# Patient Record
Sex: FEMALE | Employment: OTHER | URBAN - METROPOLITAN AREA
[De-identification: names, ages, dates, MRNs, and addresses within clinical notes are randomized per-mention and may not be internally consistent; named-entity substitution may affect disease eponyms.]

---

## 2017-06-21 ENCOUNTER — OFFICE VISIT (OUTPATIENT)
Dept: HEMATOLOGY | Age: 72
End: 2017-06-21

## 2017-06-21 VITALS
BODY MASS INDEX: 27.82 KG/M2 | RESPIRATION RATE: 18 BRPM | OXYGEN SATURATION: 94 % | HEART RATE: 87 BPM | WEIGHT: 157 LBS | DIASTOLIC BLOOD PRESSURE: 66 MMHG | TEMPERATURE: 98.2 F | HEIGHT: 63 IN | SYSTOLIC BLOOD PRESSURE: 155 MMHG

## 2017-06-21 DIAGNOSIS — K75.4 AUTOIMMUNE HEPATITIS (HCC): Primary | ICD-10-CM

## 2017-06-21 PROBLEM — Z90.49 S/P LAPAROSCOPIC CHOLECYSTECTOMY: Status: ACTIVE | Noted: 2017-05-14

## 2017-06-21 PROBLEM — M35.00 SJOGREN'S SYNDROME (HCC): Status: ACTIVE | Noted: 2017-06-21

## 2017-06-21 PROBLEM — J44.9 CHRONIC OBSTRUCTIVE PULMONARY DISEASE (HCC): Status: ACTIVE | Noted: 2017-02-16

## 2017-06-21 PROBLEM — R10.9 ABDOMINAL PAIN: Status: ACTIVE | Noted: 2017-04-30

## 2017-06-21 PROBLEM — K80.20 CHOLELITHIASIS: Status: ACTIVE | Noted: 2017-02-16

## 2017-06-21 PROBLEM — K42.9 UMBILICAL HERNIA: Status: ACTIVE | Noted: 2017-02-16

## 2017-06-21 PROBLEM — K73.9 CHRONIC HEPATITIS (HCC): Status: ACTIVE | Noted: 2017-05-24

## 2017-06-21 PROBLEM — R91.8 MULTIPLE PULMONARY NODULES: Status: ACTIVE | Noted: 2017-02-16

## 2017-06-21 PROBLEM — E03.9 HYPOTHYROIDISM: Status: ACTIVE | Noted: 2017-06-21

## 2017-06-21 PROBLEM — R10.9 ABDOMINAL PAIN: Status: RESOLVED | Noted: 2017-04-30 | Resolved: 2017-06-21

## 2017-06-21 PROBLEM — K21.9 GERD (GASTROESOPHAGEAL REFLUX DISEASE): Status: ACTIVE | Noted: 2017-06-21

## 2017-06-21 PROBLEM — K81.9 CHOLECYSTITIS: Status: ACTIVE | Noted: 2017-04-27

## 2017-06-21 RX ORDER — CALCIUM CARBONATE 600 MG
TABLET ORAL
COMMUNITY

## 2017-06-21 RX ORDER — CETIRIZINE HCL 10 MG
10 TABLET ORAL
COMMUNITY

## 2017-06-21 RX ORDER — INSULIN PUMP SYRINGE, 3 ML
EACH MISCELLANEOUS
COMMUNITY
Start: 2016-04-05

## 2017-06-21 RX ORDER — LANCETS 28 GAUGE
1 EACH MISCELLANEOUS
COMMUNITY
Start: 2017-02-08

## 2017-06-21 RX ORDER — OXYCODONE HYDROCHLORIDE 5 MG/1
5 TABLET ORAL
COMMUNITY
Start: 2017-05-01

## 2017-06-21 RX ORDER — CHOLECALCIFEROL (VITAMIN D3) 125 MCG
CAPSULE ORAL
COMMUNITY

## 2017-06-21 RX ORDER — DOCUSATE SODIUM 100 MG/1
100 CAPSULE, LIQUID FILLED ORAL
COMMUNITY
Start: 2017-05-01

## 2017-06-21 NOTE — MR AVS SNAPSHOT
Visit Information Date & Time Provider Department Dept. Phone Encounter #  
 6/21/2017  1:50 PM Kayla Dobson MD [facility] [place] [phone] [id number] Upcoming Health Maintenance Date Due DTaP/Tdap/Td series (1 - Tdap) 6/18/1964 FOBT Q 1 YEAR AGE 50-75 6/18/1993 ZOSTER VACCINE AGE 60> 6/18/2003 GLAUCOMA SCREENING Q2Y 6/18/2008 OSTEOPOROSIS SCREENING (DEXA) 6/18/2008 Pneumococcal 65+ Low/Medium Risk (1 of 2 - PCV13) 6/18/2008 MEDICARE YEARLY EXAM 6/18/2008 INFLUENZA AGE 9 TO ADULT 8/1/2017 BREAST CANCER SCRN MAMMOGRAM 11/3/2017 Allergies as of 6/21/2017  Review Complete On: 6/21/2017 By: Ld Moore Severity Noted Reaction Type Reactions Nsaids (Non-steroidal Anti-inflammatory Drug) Medium 02/17/2010    Rash Celecoxib  04/27/2017    Other (comments)  
 gi distress Dolobid [Diflunisal]  03/29/2017    Hives Current Immunizations  Never Reviewed No immunizations on file. Not reviewed this visit Vitals BP Pulse Temp Resp Height(growth percentile) 155/66 (BP 1 Location: Right arm, BP Patient Position: Sitting) 87 98.2 °F (36.8 °C) (Tympanic) 18 5' 3\" (1.6 m) Weight(growth percentile) SpO2 BMI OB Status Smoking Status 157 lb (71.2 kg) 94% 27.81 kg/m2 Postmenopausal Former Smoker BMI and BSA Data Body Mass Index Body Surface Area  
 27.81 kg/m 2 1.78 m 2 Preferred Pharmacy Pharmacy Name Phone [address], [address] [place] [phone] Your Updated Medication List  
  
   
This list is accurate as of: 6/21/17  2:38 PM.  Always use your most recent med list.  
  
  
  
  
 ACTOS PO Take  by mouth. albuterol 90 mcg/actuation inhaler Commonly known as:  PROVENTIL HFA, VENTOLIN HFA, PROAIR HFA Take  by inhalation. amitriptyline 75 mg tablet Commonly known as:  ELAVIL Take 10 mg by mouth nightly. ATENOLOL PO Take 25 mg by mouth.  
  
 calcium carbonate 600 mg calcium (1,500 mg) tablet Commonly known as:  [place] Take 1 Tab by Mouth Once a Day. cetirizine 10 mg tablet Commonly known as:  ZYRTEC 10 mg.  
  
 docusate sodium 100 mg capsule Commonly known as:  COLACE  
100 mg. FENOFIBRATE PO Take  by mouth. FLEXERIL PO Take  by mouth. FREESTYLE LANCETS 28 gauge Misc Generic drug:  lancets 1 Each. FREESTYLE LITE METER monitoring kit Generic drug:  Blood-Glucose Meter For diabetes testing. E11.42  
  
 FREESTYLE LITE STRIPS strip Generic drug:  glucose blood VI test strips 50 Each.  
  
 gabapentin 300 mg capsule Commonly known as:  NEURONTIN Take 300 mg by mouth four (4) times daily. LASIX PO Take  by mouth.  
  
 levothyroxine 112 mcg tablet Commonly known as:  SYNTHROID Take  by mouth Daily (before breakfast). LOSARTAN PO Take 100 mg by mouth.  
  
 melatonin Tab tablet Take 1 Tab by Mouth Every Night at Bedtime. METFORMIN PO Take  by mouth two (2) times a day. NEXIUM PO Take 40 mg by mouth. OCUVITE PO Take  by mouth. OXYBUTYNIN CHLORIDE PO Take  by mouth. oxyCODONE IR 5 mg immediate release tablet Commonly known as:  ROXICODONE  
5 mg. SALAGEN (PILOCARPINE) 5 mg tablet Generic drug:  pilocarpine Take 5 mg by mouth three (3) times daily. SINGULAIR 10 mg tablet Generic drug:  montelukast  
Take 10 mg by mouth daily. SPIRIVA WITH HANDIHALER 18 mcg inhalation capsule Generic drug:  tiotropium Take 1 Cap by inhalation daily. TRAMADOL PO Take  by mouth. VITAMIN D2 50,000 unit capsule Generic drug:  ergocalciferol Take 50,000 Units by mouth. ZETIA 10 mg tablet Generic drug:  ezetimibe Take  by mouth. Introducing [facility]!    
 Elie Avila introduces Backplane patient portal. Now you can access parts of your medical record, email your doctor's office, and request medication refills online. 1. In your internet browser, go to [email or web address] 2. Click on the First Time User? Click Here link in the Sign In box. You will see the New Member Sign Up page. 3. Enter your GetGifted Access Code exactly as it appears below. You will not need to use this code after youve completed the sign-up process. If you do not sign up before the expiration date, you must request a new code. · GetGifted Access Code: [email or web address] Expires: 9/19/2017  2:34 PM 
 
4. Enter the last four digits of your Social Security Number (xxxx) and Date of Birth (mm/dd/yyyy) as indicated and click Submit. You will be taken to the next sign-up page. 5. Create a GetGifted ID. This will be your GetGifted login ID and cannot be changed, so think of one that is secure and easy to remember. 6. Create a GetGifted password. You can change your password at any time. 7. Enter your Password Reset Question and Answer. This can be used at a later time if you forget your password. 8. Enter your e-mail address. You will receive e-mail notification when new information is available in [address] 9. Click Sign Up. You can now view and download portions of your medical record. 10. Click the Download Summary menu link to download a portable copy of your medical information. If you have questions, please visit the Frequently Asked Questions section of the Get[employer] website. Remember, GetGifted is NOT to be used for urgent needs. For medical emergencies, dial 911. Now available from your iPhone and Android! Please provide this summary of care documentation to your next provider. Your primary care clinician is listed as Monique Modi. If you have any questions after today's visit, please call [phone].

## 2017-06-21 NOTE — PROGRESS NOTES
[address] [facility] MD [place], MONI Bartlett PA-C [place], NP        at [address]     [address] [place] [address], [address] [address]  22.     [phone]     FAX: [phone]    at [facility]     [address] [facility] [place], [address]     [facility], [address]     [phone]     FAX: [phone]         Patient Care Team:  Romulo Kohli MD as PCP - General (Family Practice)  Herminio Granado MD (Gastroenterology)      Problem List  Date Reviewed: 7/16/2017          Codes Class Noted    GERD (gastroesophageal reflux disease) ICD-10-CM: K21.9  ICD-9-CM: 530.81  6/21/2017        Hypothyroidism ICD-10-CM: E03.9  ICD-9-CM: 244.9  6/21/2017        Sjogren's syndrome ([facility] ICD-10-CM: M35.00  ICD-9-CM: 710.2  6/21/2017    Overview Signed 6/21/2017  1:47 PM by Radha Stevenson     Overview:   recurrent parotitis prior parotid biopsy             Chronic hepatitis ([facility] ICD-10-CM: K73.9  ICD-9-CM: 571.40  5/24/2017        S/P laparoscopic cholecystectomy ICD-10-CM: Z90.49  ICD-9-CM: V45.89  5/14/2017        Chronic obstructive pulmonary disease ([facility] ICD-10-CM: J44.9  ICD-9-CM: 496  2/16/2017        Multiple pulmonary nodules ICD-10-CM: R91.8  ICD-9-CM: 793.19  2/92/4786        Umbilical hernia [facility]-: K42.9  ICD-9-CM: 553.1  2/16/2017        Type 2 diabetes mellitus with diabetic nephropathy ([facility] ICD-10-CM: E11.21  ICD-9-CM: 250.40, 583.81  5/31/2016        Pure hypercholesterolemia ICD-10-CM: E78.00  ICD-9-CM: 272.0  5/25/2016        Osteopenia ICD-10-CM: M85.80  ICD-9-CM: 733.90  1/25/2016        Essential hypertension ICD-10-CM: I10  ICD-9-CM: 401.9  9/16/2015        Urinary incontinence ICD-10-CM: R32  ICD-9-CM: 788.30  1/13/2015        Allergic rhinitis ICD-10-CM: J30.9  ICD-9-CM: 477.9  8/14/2014        Venous stasis ICD-10-CM: I87.8  ICD-9-CM: 459.81  6/25/2014        Insomnia ICD-10-CM: G47.00  ICD-9-CM: 780.52  2/26/2014        Plantar fasciitis ICD-10-CM: M72.2  ICD-9-CM: 728.71  2/26/2014        Vitamin B12 deficiency ICD-10-CM: E53.8  ICD-9-CM: 266.2  2/26/2014        Generalized osteoarthritis ICD-10-CM: M15.9  ICD-9-CM: 715.00  6/18/2013        Vitamin D deficiency ICD-10-CM: E55.9  ICD-9-CM: 268.9  1/12/2012                The physicians listed above have asked me to see Ryan Hannon in consultation regarding elevated liver enzymes and its management. All medical records sent by the referring physicians were reviewed including imaging studies and pathology. The patient is a 74 y.o.  female who was first noted to have intermittent elevation in liver transaminases dating back to 2008. Serologic evaluation for markers of chronic liver disease were positive for  ALTA, and also has Sjogrrens syndrome. CT scans of the liver were  performed in 1 and 4/2017. The results of the imaging demonstrated a normal appearing liver. A liver biopsy was performed at the time of cholecystectomy in 4/2017. This demonstrated active hepatitis with bridging fibrosis. The most recent laboratory studies indicate that the liver transaminases are normal, ALP is normal, tests of hepatic synthetic and metabolic function are normal, and the platelet count is normal.    The patient notes fatigue,     The patient has limitations in functional activities secondary to these symptoms. The patient has not experienced fevers, chills, problems concentrating, swelling of the abdomen, swelling of the lower extremities, hematemesis,   hematochezia.         ALLERGIES  Allergies   Allergen Reactions    Nsaids (Non-Steroidal Anti-Inflammatory Drug) Rash    Celecoxib Other (comments)     gi distress    Dolobid [Diflunisal] Hives       MEDICATIONS  Current Outpatient Prescriptions   Medication Sig    docusate sodium (COLACE) 100 mg capsule 100 mg.    lancets (FREESTYLE LANCETS) 28 gauge misc 1 Each.    Blood-Glucose Meter (FREESTYLE LITE METER) monitoring kit For diabetes testing. E11.42    glucose blood VI test strips (FREESTYLE LITE STRIPS) strip 50 Each.  oxyCODONE IR (ROXICODONE) 5 mg immediate release tablet 5 mg.  tiotropium (SPIRIVA WITH HANDIHALER) 18 mcg inhalation capsule Take 1 Cap by inhalation daily.  ATENOLOL PO Take 25 mg by mouth.  ESOMEPRAZOLE MAGNESIUM (NEXIUM PO) Take 40 mg by mouth.  pilocarpine (SALAGEN, PILOCARPINE,) 5 mg tablet Take 5 mg by mouth three (3) times daily.  montelukast (SINGULAIR) 10 mg tablet Take 10 mg by mouth daily.  ezetimibe (ZETIA) 10 mg tablet Take  by mouth.  amitriptyline (ELAVIL) 75 mg tablet Take 10 mg by mouth nightly.  OXYBUTYNIN CHLORIDE PO Take  by mouth.  LOSARTAN PO Take 100 mg by mouth.  PIOGLITAZONE HCL (ACTOS PO) Take  by mouth.  levothyroxine (SYNTHROID) 112 mcg tablet Take  by mouth Daily (before breakfast).  CYCLOBENZAPRINE HCL (FLEXERIL PO) Take  by mouth.  ergocalciferol (VITAMIN D2) 50,000 unit capsule Take 50,000 Units by mouth.  gabapentin (NEURONTIN) 300 mg capsule Take 300 mg by mouth four (4) times daily.  VIT C/VIT E/LUTEIN/MIN/OMEGA-3 (OCUVITE PO) Take  by mouth.  METFORMIN HCL (METFORMIN PO) Take  by mouth two (2) times a day.  albuterol (PROVENTIL HFA, VENTOLIN HFA, PROAIR HFA) 90 mcg/actuation inhaler Take  by inhalation.  TRAMADOL HCL (TRAMADOL PO) Take  by mouth.  calcium carbonate (CALTREX) 600 mg calcium (1,500 mg) tablet Take 1 Tab by Mouth Once a Day.  cetirizine (ZYRTEC) 10 mg tablet 10 mg.    melatonin tab tablet Take 1 Tab by Mouth Every Night at Bedtime.  FUROSEMIDE (LASIX PO) Take  by mouth.  FENOFIBRATE PO Take  by mouth. No current facility-administered medications for this visit. SYSTEM REVIEW NOT RELATED TO LIVER DISEASE OR REVIEWED ABOVE:  Constitution systems: Negative for fever, chills, weight gain, weight loss.    Eyes: Negative for visual changes. ENT: Negative for sore throat, painful swallowing. Respiratory: Negative for cough, hemoptysis, SOB. Cardiology: Negative for chest pain, palpitations. GI:  Negative for constipation or diarrhea. : Negative for urinary frequency, dysuria, hematuria, nocturia. Skin: Negative for rash. Hematology: Negative for easy bruising, blood clots. Musculo-skelatal: Negative for back pain, muscle pain, weakness. Neurologic: Negative for headaches, dizziness, vertigo, memory problems not related to HE. Psychology: Negative for anxiety, depression. FAMILY HISTORY:  The father  of alcoholism. The mother  of COPD. There is no family history of liver disease. SOCIAL HISTORY:  The patient is . The patient has no children. The patient stopped using tobacco products in . The patient has never consumed significant amounts of alcohol. The patient used to work in nursing. PHYSICAL EXAMINATION:  Visit Vitals    /66 (BP 1 Location: Right arm, BP Patient Position: Sitting)    Pulse 87    Temp 98.2 °F (36.8 °C) (Tympanic)    Resp 18    Ht 5' 3\" (1.6 m)    Wt 157 lb (71.2 kg)    SpO2 94%    BMI 27.81 kg/m2     General: No acute distress. Eyes: Sclera anicteric. ENT: No oral lesions. Thyroid normal.  Nodes: No adenopathy. Skin: No spider angiomata. No jaundice. No palmar erythema. Respiratory: Lungs clear to auscultation. Cardiovascular: Regular heart rate. No murmurs. No JVD. Abdomen: Soft non-tender. Liver size normal to percussion/palpation. Spleen not palpable. No obvious ascites. Extremities: No edema. No muscle wasting. No gross arthritic changes. Neurologic: Alert and oriented. Cranial nerves grossly intact. No asterixis.     LABORATORY STUDIES:  From 3/2017  AST/ALT/ALP/T Bili/ALB:  44/20/53/0.4/4.5    From 2017  AST/ALT/ALP/T Bili/ALB:  30/15/55/0.2/4.1  WBC/HB/PLT/INR:  5.9/10.4/203  BUN/CREAT: 11/0.8    SEROLOGIES:  9/2010. ALTA positive  5/2015. ALTA positive  5/2016. HAV total positive, HBsAntigen negative, anti-HBcore negative, anti-HBsurface negative,   5/2017. Anti-HCV negative, AMA negative,     LIVER HISTOLOGY:  4/2017. Active hepatitis with bridging fibrosis. Biopsy slides not reviewed by MLS. ENDOSCOPIC PROCEDURES:  Not available or performed    RADIOLOGY:  1/2017. CT scan abdomen with IV contrast.  Normal appearing liver. No liver mass lesions. Normal spleen. No ascites. 4/2017. CT scan abdomen with IV contrast.  Changes consistent with fatty liver. No liver mass lesions. Normal spleen. No ascites. OTHER TESTING:  Not available or performed    ASSESSMENT AND PLAN:  Intermitant elevation in liver transaminases which appears to be secondary to a long standing autoimmune hepatitis. A liver biopsy performed at the time of cholecystectomy demonstrated active hepatitis and bridging fibrosis. Liver function is normal.  The platelet count is normal.      Serologic testing to help define the cause of the laboratory abnormality were positive for ALTA. The main issue here is whether she should be treated for autoimmune hepatitis and with what agent. She is 74years of age and has multiple other medical issues although she does get along fairly well. She already has advanced stage 3 fibrosis on the biopsy. If she had less fibrosis I would probably opt not to treat this at all. However, with stage 3 fibrosis the moderate activity grade including some PMN she is likely to progress and develop cirrhosis in about 5 years. Even then this might not be symptmatic or important given other medical issues. The patient had a liver biopsy performed in 4/2017. We will request that the liver biopsy slides sent be to me for my personal review. If we opt to treat this the optimal treatmetn would be prednisone and cellcept and then taper the prednisone off and remain on cellcept. The issue is to know if she is responding since her liver enzymes are noral and do not reflect the severity of the inflammatory process. The patient was directed to continue all current medications at the current dosages. There are no contraindications for the patient to take any medications that are necessary for treatment of other medical issues. The patient was counseled regarding alcohol consumption. Vaccination for viral hepatitis A is not needed. The patient has serologic evidence of prior exposure or vaccination with immunity. All of the above issues were discussed with the patient. All questions were answered. The patient expressed a clear understanding of the above. [facility] in 4 weeks. By  Then I will have had a chance to review the biopsy and we can discuss treatment and side effect options.     Dorene Altamirano MD  [facility] [place] [facility] [address] [place] [address], [address] [place] [address] Flory Bautista, [address] [place] [address] Street [address] [phone]

## 2017-06-21 NOTE — PROGRESS NOTES
Zcahary Bang is a 74 y.o. female        1. Have you been to the ER, urgent care clinic or hospitalized since your last visit? NO.     2. Have you seen or consulted any other health care providers outside of the [address] [place] [address] since your last visit (Include any pap smears or colon screening)?  NO

## 2017-07-16 PROBLEM — K80.20 CHOLELITHIASIS: Status: RESOLVED | Noted: 2017-02-16 | Resolved: 2017-07-16

## 2017-07-16 PROBLEM — Z90.49 S/P CHOLECYSTECTOMY: Status: RESOLVED | Noted: 2017-04-27 | Resolved: 2017-07-16

## 2017-07-16 PROBLEM — K75.4 AUTOIMMUNE HEPATITIS (HCC): Status: ACTIVE | Noted: 2017-07-16

## 2017-07-16 PROBLEM — Z90.49 S/P CHOLECYSTECTOMY: Status: ACTIVE | Noted: 2017-04-27

## 2017-07-19 ENCOUNTER — TELEPHONE (OUTPATIENT)
Dept: HEMATOLOGY | Age: 72
End: 2017-07-19

## 2017-07-19 NOTE — TELEPHONE ENCOUNTER
Called pt. To inform her we needed a signature from her to request a release of liver biopsy slides.   Pt stated she would attempt to pick them up from [facility].